# Patient Record
Sex: FEMALE | Race: WHITE | NOT HISPANIC OR LATINO
[De-identification: names, ages, dates, MRNs, and addresses within clinical notes are randomized per-mention and may not be internally consistent; named-entity substitution may affect disease eponyms.]

---

## 2018-12-28 ENCOUNTER — TRANSCRIPTION ENCOUNTER (OUTPATIENT)
Age: 73
End: 2018-12-28

## 2019-02-21 ENCOUNTER — TRANSCRIPTION ENCOUNTER (OUTPATIENT)
Age: 74
End: 2019-02-21

## 2019-07-03 ENCOUNTER — APPOINTMENT (OUTPATIENT)
Dept: MRI IMAGING | Facility: CLINIC | Age: 74
End: 2019-07-03

## 2019-11-13 ENCOUNTER — TRANSCRIPTION ENCOUNTER (OUTPATIENT)
Age: 74
End: 2019-11-13

## 2022-11-28 ENCOUNTER — NON-APPOINTMENT (OUTPATIENT)
Age: 77
End: 2022-11-28

## 2022-12-09 ENCOUNTER — OFFICE (OUTPATIENT)
Dept: URBAN - METROPOLITAN AREA CLINIC 104 | Facility: CLINIC | Age: 77
Setting detail: OPHTHALMOLOGY
End: 2022-12-09
Payer: MEDICARE

## 2022-12-09 DIAGNOSIS — H52.4: ICD-10-CM

## 2022-12-09 DIAGNOSIS — L25.9: ICD-10-CM

## 2022-12-09 DIAGNOSIS — H35.40: ICD-10-CM

## 2022-12-09 DIAGNOSIS — H26.492: ICD-10-CM

## 2022-12-09 DIAGNOSIS — H35.372: ICD-10-CM

## 2022-12-09 DIAGNOSIS — H43.813: ICD-10-CM

## 2022-12-09 PROCEDURE — 92004 COMPRE OPH EXAM NEW PT 1/>: CPT | Performed by: OPHTHALMOLOGY

## 2022-12-09 PROCEDURE — 92015 DETERMINE REFRACTIVE STATE: CPT | Performed by: OPHTHALMOLOGY

## 2022-12-09 ASSESSMENT — REFRACTION_MANIFEST
OS_VA2: 20/20(J1+)
OD_VA2: 20/20(J1+)
OD_VA1: 20/20
OS_CYLINDER: -0.50
OD_SPHERE: -1.00
OU_VA: 20/20
OS_AXIS: 60
OD_CYLINDER: -0.75
OD_AXIS: 175
OS_VA1: 20/20
OS_ADD: +2.50
OS_SPHERE: -1.50
OD_ADD: +2.50

## 2022-12-09 ASSESSMENT — REFRACTION_CURRENTRX
OD_VPRISM_DIRECTION: PROGS
OS_SPHERE: -1.75
OD_ADD: +2.25
OS_VPRISM_DIRECTION: PROGS
OD_AXIS: 176
OS_ADD: +2.25
OD_SPHERE: -1.50
OS_OVR_VA: 20/
OD_OVR_VA: 20/
OD_CYLINDER: -1.25

## 2022-12-09 ASSESSMENT — VISUAL ACUITY
OS_BCVA: 20/25-
OD_BCVA: 20/30-

## 2022-12-09 ASSESSMENT — SPHEQUIV_DERIVED
OD_SPHEQUIV: -1.625
OS_SPHEQUIV: -1.75
OS_SPHEQUIV: -1.75
OD_SPHEQUIV: -1.375

## 2022-12-09 ASSESSMENT — LID EXAM ASSESSMENTS
OD_EDEMA: RLL RUL 2+
OS_EDEMA: LLL LUL 2+

## 2022-12-09 ASSESSMENT — KERATOMETRY
OD_AXISANGLE_DEGREES: 086
OS_AXISANGLE_DEGREES: 104
OD_K1POWER_DIOPTERS: 43.00
OS_K2POWER_DIOPTERS: 45.50
OD_K2POWER_DIOPTERS: 46.00
OS_K1POWER_DIOPTERS: 44.00

## 2022-12-09 ASSESSMENT — REFRACTION_AUTOREFRACTION
OD_CYLINDER: -0.75
OS_CYLINDER: -1.50
OD_SPHERE: -1.25
OS_SPHERE: -1.00
OD_AXIS: 174
OS_AXIS: 062

## 2022-12-09 ASSESSMENT — AXIALLENGTH_DERIVED
OS_AL: 23.8167
OD_AL: 23.8607
OD_AL: 23.7615
OS_AL: 23.8167

## 2022-12-09 ASSESSMENT — CONFRONTATIONAL VISUAL FIELD TEST (CVF)
OS_FINDINGS: FULL
OD_FINDINGS: FULL

## 2022-12-09 ASSESSMENT — TONOMETRY
OD_IOP_MMHG: 14
OS_IOP_MMHG: 14

## 2022-12-10 PROBLEM — L25.9 CONTACT DERMATITIS, UNSPECIFIED CAUSE ; BOTH EYES: Status: ACTIVE | Noted: 2022-12-09

## 2022-12-10 PROBLEM — H35.40 PERIPAPILLARY ATROPHY ; BOTH EYES: Status: ACTIVE | Noted: 2022-12-09

## 2023-03-14 ENCOUNTER — OUTPATIENT (OUTPATIENT)
Dept: OUTPATIENT SERVICES | Facility: HOSPITAL | Age: 78
LOS: 1 days | End: 2023-03-14
Payer: MEDICARE

## 2023-03-14 ENCOUNTER — APPOINTMENT (OUTPATIENT)
Dept: MRI IMAGING | Facility: CLINIC | Age: 78
End: 2023-03-14
Payer: MEDICARE

## 2023-03-14 DIAGNOSIS — Z00.8 ENCOUNTER FOR OTHER GENERAL EXAMINATION: ICD-10-CM

## 2023-03-14 PROCEDURE — 70551 MRI BRAIN STEM W/O DYE: CPT | Mod: MH

## 2023-03-14 PROCEDURE — 70551 MRI BRAIN STEM W/O DYE: CPT | Mod: 26,MH

## 2023-11-15 ENCOUNTER — APPOINTMENT (OUTPATIENT)
Dept: ORTHOPEDIC SURGERY | Facility: CLINIC | Age: 78
End: 2023-11-15
Payer: MEDICARE

## 2023-11-15 VITALS
HEART RATE: 85 BPM | WEIGHT: 160 LBS | BODY MASS INDEX: 24.25 KG/M2 | DIASTOLIC BLOOD PRESSURE: 80 MMHG | SYSTOLIC BLOOD PRESSURE: 150 MMHG | HEIGHT: 68 IN

## 2023-11-15 DIAGNOSIS — Z86.39 PERSONAL HISTORY OF OTHER ENDOCRINE, NUTRITIONAL AND METABOLIC DISEASE: ICD-10-CM

## 2023-11-15 DIAGNOSIS — Z86.03 OTHER SPECIFIED POSTPROCEDURAL STATES: ICD-10-CM

## 2023-11-15 DIAGNOSIS — S82.131A DISPLACED FRACTURE OF MEDIAL CONDYLE OF RIGHT TIBIA, INITIAL ENCOUNTER FOR CLOSED FRACTURE: ICD-10-CM

## 2023-11-15 DIAGNOSIS — S82.141S DISPLACED BICONDYLAR FRACTURE OF RIGHT TIBIA, SEQUELA: ICD-10-CM

## 2023-11-15 DIAGNOSIS — Z98.890 OTHER SPECIFIED POSTPROCEDURAL STATES: ICD-10-CM

## 2023-11-15 DIAGNOSIS — Z83.3 FAMILY HISTORY OF DIABETES MELLITUS: ICD-10-CM

## 2023-11-15 PROCEDURE — 27530 TREAT KNEE FRACTURE: CPT | Mod: RT

## 2023-11-15 PROCEDURE — 73560 X-RAY EXAM OF KNEE 1 OR 2: CPT | Mod: RT

## 2023-11-15 PROCEDURE — 99203 OFFICE O/P NEW LOW 30 MIN: CPT | Mod: 57

## 2023-11-15 RX ORDER — METFORMIN HYDROCHLORIDE 500 MG/1
500 TABLET, COATED ORAL
Refills: 0 | Status: ACTIVE | COMMUNITY

## 2023-12-13 ENCOUNTER — APPOINTMENT (OUTPATIENT)
Dept: ORTHOPEDIC SURGERY | Facility: CLINIC | Age: 78
End: 2023-12-13
Payer: MEDICARE

## 2023-12-13 VITALS — BODY MASS INDEX: 24.25 KG/M2 | WEIGHT: 160 LBS | HEIGHT: 68 IN

## 2023-12-13 DIAGNOSIS — S82.131D DISPLACED FRACTURE OF MEDIAL CONDYLE OF RIGHT TIBIA, SUBSEQUENT ENCOUNTER FOR CLOSED FRACTURE WITH ROUTINE HEALING: ICD-10-CM

## 2023-12-13 PROCEDURE — 99024 POSTOP FOLLOW-UP VISIT: CPT

## 2023-12-13 PROCEDURE — 73560 X-RAY EXAM OF KNEE 1 OR 2: CPT | Mod: RT

## 2023-12-13 NOTE — PHYSICAL EXAM
[de-identified] : Physical Exam: General: Well appearing, no acute distress, A&O Neurologic: A&Ox3, No focal deficits Head: NCAT without abrasions, lacerations, or ecchymosis to head, face, or scalp Respiratory: Equal chest wall expansion bilaterally, no accessory muscle use Lymphatic: No lymphadenopathy palpated Skin: Warm and dry Psychiatric: Normal mood and affect  Right lower extremity: SILT s/s/sp/dp/t Fires EHL/FHL/GS/TA 2+ DP/PT pulse brisk capillary refill Resolving ecchymosis posterior to knee and anterior tibia [de-identified] :  2 views of the right knee obtained today show continued healing of the minimally displaced tibial plateau fracture

## 2023-12-13 NOTE — DISCUSSION/SUMMARY
[de-identified] : 78-year-old female with healing right nonoperative tibial plateau fracture.  She can continue to advance her physical therapy.  She may wean from the brace.  Physical therapy ordered for active assisted range of motion, passive range of motion, weightbearing as tolerated, gait training, strengthening, no restrictions.  Modalities as indicated.  As long as she continues to improve no orthopedic trauma intervention is required but we will help facilitate future care as needed. The patient may follow up as needed.  If she is still struggling in a month, have her return for repeat x-rays.  Osteopenia and osteoporosis are significant risk factors for fragility fractures. Given the type of fracture that has occurred, I would highly recommend that the patient followup with their primary care physician to discuss treatment for low bone mineral density. We discussed the benefits of these medications frequently far outweigh the small risks. Their primary care physician can call the office with any specific questions or concerns.  The question of when to drive is impossible to generalize to everyone because it is largely dependent on the individual.  Importantly, doctors do not have a license with the DMV to "clear you" or "release you" to return to driving.  There are 3 primary criteria that must be met.  You need to be off of narcotic pain medicines (otherwise you are driving under the influence).  You need to be able to get in and out of the 's seat comfortably.  And you must have regained your normal reflexes / strength.  Also, return to driving depends partly on what side had surgery (ie. Right leg operates the pedals; people with Left side surgery can generally get back to driving much sooner unless you have a clutch).  The average time to return to driving is around 2 weeks after you return to normal walking for the right side and usually sooner for the left.  We recommend 'testing' yourself with another licensed  in an empty parking lot or quiet street first in order to check your reflexes moving your foot from pedal to pedal.  Drake Belcher MD Orthopaedic Trauma Surgeon [facility]  Orthopaedic Trauma, [facility]

## 2023-12-13 NOTE — HISTORY OF PRESENT ILLNESS
[de-identified] : Patient is a pleasant 78-year-old female returns today for follow-up of her right proximal tibia fracture. she has been doing well since the last visit.  She has been appropriate with limiting her weightbearing.  Wearing range of motion brace as directed.  Current using a wheelchair and a walker today. [Bending] : worsened by bending [Weight Bearing] : worsened by weight bearing [Recumbency] : relieved by recumbency [Rest] : relieved by rest

## 2023-12-18 ENCOUNTER — OFFICE (OUTPATIENT)
Dept: URBAN - METROPOLITAN AREA CLINIC 104 | Facility: CLINIC | Age: 78
Setting detail: OPHTHALMOLOGY
End: 2023-12-18
Payer: MEDICARE

## 2023-12-18 DIAGNOSIS — H35.40: ICD-10-CM

## 2023-12-18 DIAGNOSIS — L25.9: ICD-10-CM

## 2023-12-18 DIAGNOSIS — H26.492: ICD-10-CM

## 2023-12-18 DIAGNOSIS — H43.813: ICD-10-CM

## 2023-12-18 DIAGNOSIS — H35.372: ICD-10-CM

## 2023-12-18 DIAGNOSIS — H52.4: ICD-10-CM

## 2023-12-18 PROCEDURE — 92015 DETERMINE REFRACTIVE STATE: CPT | Performed by: OPHTHALMOLOGY

## 2023-12-18 PROCEDURE — 92014 COMPRE OPH EXAM EST PT 1/>: CPT | Performed by: OPHTHALMOLOGY

## 2023-12-18 ASSESSMENT — REFRACTION_MANIFEST
OD_VA1: 20/30+
OD_SPHERE: -1.75
OD_AXIS: 5
OS_ADD: +2.50
OS_VA1: 20/30
OD_CYLINDER: -1.25
OS_SPHERE: -2.00
OD_ADD: +2.50
OS_CYLINDER: -0.75
OS_AXIS: 35

## 2023-12-18 ASSESSMENT — REFRACTION_CURRENTRX
OS_SPHERE: -2.00
OS_OVR_VA: 20/
OS_AXIS: 24
OD_ADD: +2.00
OD_AXIS: 2
OS_ADD: +2.00
OD_SPHERE: -1.50
OD_OVR_VA: 20/
OD_CYLINDER: -1.25
OS_CYLINDER: -0.25

## 2023-12-18 ASSESSMENT — SPHEQUIV_DERIVED
OS_SPHEQUIV: -2.375
OD_SPHEQUIV: -2.375
OD_SPHEQUIV: -2.25
OS_SPHEQUIV: -2.375

## 2023-12-18 ASSESSMENT — REFRACTION_AUTOREFRACTION
OD_SPHERE: -1.75
OS_CYLINDER: -0.75
OD_AXIS: 163
OS_SPHERE: -2.00
OS_AXIS: 35
OD_CYLINDER: -1.00

## 2023-12-18 ASSESSMENT — LID EXAM ASSESSMENTS
OD_EDEMA: RLL RUL 2+
OS_EDEMA: LLL LUL 2+

## 2023-12-18 ASSESSMENT — CONFRONTATIONAL VISUAL FIELD TEST (CVF)
OD_FINDINGS: FULL
OS_FINDINGS: FULL

## 2025-04-23 ENCOUNTER — EMERGENCY (EMERGENCY)
Facility: HOSPITAL | Age: 80
LOS: 1 days | End: 2025-04-23
Attending: STUDENT IN AN ORGANIZED HEALTH CARE EDUCATION/TRAINING PROGRAM
Payer: COMMERCIAL

## 2025-04-23 VITALS
RESPIRATION RATE: 16 BRPM | DIASTOLIC BLOOD PRESSURE: 70 MMHG | OXYGEN SATURATION: 98 % | TEMPERATURE: 98 F | HEART RATE: 68 BPM | SYSTOLIC BLOOD PRESSURE: 138 MMHG

## 2025-04-23 VITALS
SYSTOLIC BLOOD PRESSURE: 173 MMHG | OXYGEN SATURATION: 97 % | DIASTOLIC BLOOD PRESSURE: 77 MMHG | WEIGHT: 159.17 LBS | TEMPERATURE: 97 F | HEART RATE: 75 BPM | RESPIRATION RATE: 16 BRPM

## 2025-04-23 PROCEDURE — 73110 X-RAY EXAM OF WRIST: CPT | Mod: 26,LT

## 2025-04-23 PROCEDURE — 73130 X-RAY EXAM OF HAND: CPT | Mod: 26,LT

## 2025-04-23 PROCEDURE — 29125 APPL SHORT ARM SPLINT STATIC: CPT | Mod: LT

## 2025-04-23 PROCEDURE — 73090 X-RAY EXAM OF FOREARM: CPT | Mod: 26,LT

## 2025-04-23 PROCEDURE — 73090 X-RAY EXAM OF FOREARM: CPT

## 2025-04-23 PROCEDURE — 73130 X-RAY EXAM OF HAND: CPT

## 2025-04-23 PROCEDURE — 99284 EMERGENCY DEPT VISIT MOD MDM: CPT | Mod: 25

## 2025-04-23 PROCEDURE — 73110 X-RAY EXAM OF WRIST: CPT

## 2025-04-23 NOTE — ED PROVIDER NOTE - PATIENT PORTAL LINK FT
You can access the FollowMyHealth Patient Portal offered by [facility] by registering at the following website: [email or web address]. By joining [employer]’s FollowMyHealth portal, you will also be able to view your health information using other applications (apps) compatible with our system.

## 2025-04-23 NOTE — ED PROVIDER NOTE - CLINICAL SUMMARY MEDICAL DECISION MAKING FREE TEXT BOX
HPI: 79-year-old female past medical history of DM2 presents status post fall yesterday.  Patient states she was running from a dog and fell landing on outstretched left wrist.  Endorses edema and pain at the left wrist just proximal to the base of the thumb extending into the distal radius without complete loss of function.  Denies loss of sensation.  States having difficulty with  strength.  Denies head strike or LOC.  No AC use.  No other complaints at this time.    ROS:   General: No fever, no chills, no malaise, no fatigue  Respiratory: No cough, no dyspnea, no pleuritic chest pain  Cardiac: no chest pain, no palpitations, no edema  Musculoskeletal:  see HPI  Neurologic: No headache, no vertigo, no paresthesia, no focal deficits  Skin:  ecchymosis of the left wrist  All other ROS are negative    PE:  General: NAD; well appearing; A&O x3   Head: NC/AT  Eyes: PERRL, EOMI  ENT: Airway patent, mmm  Back: Normal  spine  Extremities: Lateral left wrist just proximal to the thenar base with edema.  Decreased range of motion in all directions at the left wrist due to pain.  Otherwise remainder of exam FROM, symmetric pulses, capillary refill < 2 seconds, no edema, 4/5 strength of left hand and  strength and wrist flexion/extension.  Remainder of exam 5/5 strength in b/l UE and LE  Skin: Ecchymosis of the volar left wrist at the distal radius TTP over the  Neurologic: alert, speech clear, no focal deficits, CN II-XII grossly intact, normal gait, sensation grossly intact  Psych: nl mood/affect, nl insight.    MDM: 79-year-old female past medical history of DM2 presents status post fall yesterday.  Exam concerning for sprain versus fracture.  Will obtain x-rays of left wrist/hand/forearm.  Will provide symptomatic control as needed.  Disposition pending results.  Consider splinting pending results.

## 2025-04-23 NOTE — ED ADULT NURSE NOTE - OBJECTIVE STATEMENT
Patient presents to ED c/p fall on the floor s/p being chased by a dog. Pt states that the dog did not bite her, but she fell when she became frightened. Denies head strike and LOC, pt is c/o left wrist pain.

## 2025-04-23 NOTE — ED PROVIDER NOTE - NSFOLLOWUPINSTRUCTIONS_ED_ALL_ED_FT
You were seen and evaluated in the emergency department for your symptoms.  Your results did not show any evidence of fracture or dislocation.  Please see results in the following pages.    Please utilize the brace as directed for comfort.  You may remove this to shower.    Please follow up with orthopedics clinic at your scheduled appointment. You should receive a phone call with your appointment date at time in the next 1-2 days. If you do not receive a phone call please contact the number below to confirm your appointment details.    Please return to the ED for any new or concerning symptoms including but not limited to fever, chills, chest pain, difficulty breathing, palpitations, weakness, numbness, loss of function, loss of sensation..

## 2025-04-23 NOTE — ED ADULT TRIAGE NOTE - CHIEF COMPLAINT QUOTE
pt walk in c/o fall from standing height onto left side (buttocks and left wrist) yesterday afternoon, denies head strike/LOC/AC. Pt states woke up and wrist is hurting worse than yesterday. Swelling and bruising noted to left wrist, reduced  strength and ROM

## 2025-04-25 ENCOUNTER — APPOINTMENT (OUTPATIENT)
Dept: ORTHOPEDIC SURGERY | Facility: CLINIC | Age: 80
End: 2025-04-25
Payer: MEDICARE

## 2025-04-25 VITALS
SYSTOLIC BLOOD PRESSURE: 123 MMHG | WEIGHT: 158 LBS | DIASTOLIC BLOOD PRESSURE: 65 MMHG | HEIGHT: 68 IN | BODY MASS INDEX: 23.95 KG/M2

## 2025-04-25 DIAGNOSIS — M25.532 PAIN IN LEFT WRIST: ICD-10-CM

## 2025-04-25 DIAGNOSIS — M25.432 EFFUSION, LEFT WRIST: ICD-10-CM

## 2025-04-25 DIAGNOSIS — M19.032 PRIMARY OSTEOARTHRITIS, LEFT WRIST: ICD-10-CM

## 2025-04-25 DIAGNOSIS — S63.502S UNSPECIFIED SPRAIN OF LEFT WRIST, SEQUELA: ICD-10-CM

## 2025-04-25 PROCEDURE — 73110 X-RAY EXAM OF WRIST: CPT | Mod: 50

## 2025-04-25 PROCEDURE — 99215 OFFICE O/P EST HI 40 MIN: CPT | Mod: 25

## 2025-04-25 PROCEDURE — 20605 DRAIN/INJ JOINT/BURSA W/O US: CPT | Mod: LT

## 2025-04-25 RX ORDER — MELOXICAM 15 MG/1
15 TABLET ORAL DAILY
Qty: 15 | Refills: 1 | Status: ACTIVE | COMMUNITY
Start: 2025-04-25 | End: 1900-01-01

## 2025-05-08 ENCOUNTER — APPOINTMENT (OUTPATIENT)
Dept: ORTHOPEDIC SURGERY | Facility: CLINIC | Age: 80
End: 2025-05-08
Payer: MEDICARE

## 2025-05-08 VITALS — WEIGHT: 155 LBS | BODY MASS INDEX: 23.49 KG/M2 | HEIGHT: 68 IN

## 2025-05-08 DIAGNOSIS — M25.432 EFFUSION, LEFT WRIST: ICD-10-CM

## 2025-05-08 DIAGNOSIS — S63.502S UNSPECIFIED SPRAIN OF LEFT WRIST, SEQUELA: ICD-10-CM

## 2025-05-08 DIAGNOSIS — M19.032 PRIMARY OSTEOARTHRITIS, LEFT WRIST: ICD-10-CM

## 2025-05-08 DIAGNOSIS — M25.532 PAIN IN LEFT WRIST: ICD-10-CM

## 2025-05-08 PROCEDURE — 73110 X-RAY EXAM OF WRIST: CPT | Mod: 50

## 2025-05-08 PROCEDURE — 99214 OFFICE O/P EST MOD 30 MIN: CPT | Mod: 25

## 2025-05-08 PROCEDURE — 20605 DRAIN/INJ JOINT/BURSA W/O US: CPT | Mod: LT

## 2025-06-19 ENCOUNTER — APPOINTMENT (OUTPATIENT)
Dept: ORTHOPEDIC SURGERY | Facility: CLINIC | Age: 80
End: 2025-06-19